# Patient Record
Sex: MALE | Race: WHITE
[De-identification: names, ages, dates, MRNs, and addresses within clinical notes are randomized per-mention and may not be internally consistent; named-entity substitution may affect disease eponyms.]

---

## 2023-04-23 ENCOUNTER — HOSPITAL ENCOUNTER (EMERGENCY)
Dept: HOSPITAL 54 - ER | Age: 48
Discharge: LEFT BEFORE DISCHARGE | End: 2023-04-23
Payer: MEDICARE

## 2023-04-23 VITALS — BODY MASS INDEX: 24.25 KG/M2 | HEIGHT: 68 IN | WEIGHT: 160 LBS

## 2023-04-23 VITALS — SYSTOLIC BLOOD PRESSURE: 169 MMHG | DIASTOLIC BLOOD PRESSURE: 109 MMHG
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
## 2023-04-23 DIAGNOSIS — S60.212A: Primary | ICD-10-CM
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
## 2023-04-23 NOTE — NUR
DR CARR SPOKE WITH CHARGE NURSE JOSHUA AT THE [facility] TO NOTIFY THAT THE 
PATIENT HAVE LEFT OUR FACILITY AND HEADED OVER THERE.

## 2023-04-23 NOTE — NUR
CALLED DR HALL AS PER STATED BY PT AS PRIMARY NEPHROLOGIST. ON CALL MD RETURNED 
CALL AND SPOKE WITH DR JAY.

## 2023-04-23 NOTE — NUR
PT PT WAS TRIAGE AND SPOKEN WITH THE MD. PT DECIDED THAT HE WILL BE GOING TO 
[facility] VIA [facility]. PT IS AMBULATORY ON [facility] [facility]. MD IS AWARE OF PT'S 
DECISION.

## 2023-04-23 NOTE — NUR
2 [employer] OFFICERS CAME TO SEE THE PATIENT. OFFICERS WHERE INFORMED THAT THE 
PATIENT WENT TO [facility].